# Patient Record
Sex: FEMALE | Race: WHITE | NOT HISPANIC OR LATINO | URBAN - METROPOLITAN AREA
[De-identification: names, ages, dates, MRNs, and addresses within clinical notes are randomized per-mention and may not be internally consistent; named-entity substitution may affect disease eponyms.]

---

## 2017-08-23 ENCOUNTER — OFFICE (OUTPATIENT)
Dept: URBAN - METROPOLITAN AREA CLINIC 11 | Facility: CLINIC | Age: 37
End: 2017-08-23

## 2017-08-23 VITALS
DIASTOLIC BLOOD PRESSURE: 72 MMHG | SYSTOLIC BLOOD PRESSURE: 118 MMHG | WEIGHT: 221 LBS | HEIGHT: 65 IN | HEART RATE: 79 BPM

## 2017-08-23 DIAGNOSIS — R10.9 UNSPECIFIED ABDOMINAL PAIN: ICD-10-CM

## 2017-08-23 DIAGNOSIS — E66.9 OBESITY, UNSPECIFIED: ICD-10-CM

## 2017-08-23 DIAGNOSIS — K58.9 IRRITABLE BOWEL SYNDROME WITHOUT DIARRHEA: ICD-10-CM

## 2017-08-23 LAB
CORTISOL LEVEL: 6.3 UG/DL
FREE T4: 1.06 NG/DL (ref 0.8–1.8)
THYROID STIMULATING HORMONE: TSH: 1.59 MIU/L (ref 0.3–4.2)

## 2017-08-23 PROCEDURE — 99243 OFF/OP CNSLTJ NEW/EST LOW 30: CPT | Performed by: INTERNAL MEDICINE

## 2017-08-23 RX ORDER — DICYCLOMINE HYDROCHLORIDE 10 MG/1
40 CAPSULE ORAL
Qty: 360 | Refills: 3 | Status: COMPLETED
Start: 2017-08-23 | End: 2020-09-18

## 2020-09-29 ENCOUNTER — OFFICE (OUTPATIENT)
Dept: URBAN - METROPOLITAN AREA CLINIC 19 | Facility: CLINIC | Age: 40
End: 2020-09-29

## 2020-09-29 VITALS
DIASTOLIC BLOOD PRESSURE: 83 MMHG | WEIGHT: 224 LBS | OXYGEN SATURATION: 96 % | SYSTOLIC BLOOD PRESSURE: 137 MMHG | HEART RATE: 86 BPM | HEIGHT: 64 IN

## 2020-09-29 DIAGNOSIS — K58.9 IRRITABLE BOWEL SYNDROME WITHOUT DIARRHEA: ICD-10-CM

## 2020-09-29 DIAGNOSIS — R14.0 ABDOMINAL DISTENSION (GASEOUS): ICD-10-CM

## 2020-09-29 DIAGNOSIS — R10.9 UNSPECIFIED ABDOMINAL PAIN: ICD-10-CM

## 2020-09-29 LAB
CBC, PLATELET, NO DIFFERENTIAL: HEMATOCRIT: 39.6 % (ref 34–46.6)
CBC, PLATELET, NO DIFFERENTIAL: HEMOGLOBIN: 12.3 G/DL (ref 11.1–15.9)
CBC, PLATELET, NO DIFFERENTIAL: MCH: 26.7 PG (ref 26.6–33)
CBC, PLATELET, NO DIFFERENTIAL: MCHC: 31.1 G/DL — LOW (ref 31.5–35.7)
CBC, PLATELET, NO DIFFERENTIAL: MCV: 86 FL (ref 79–97)
CBC, PLATELET, NO DIFFERENTIAL: PLATELETS: 404 X10E3/UL (ref 150–450)
CBC, PLATELET, NO DIFFERENTIAL: RBC: 4.61 X10E6/UL (ref 3.77–5.28)
CBC, PLATELET, NO DIFFERENTIAL: RDW: 13.1 % (ref 11.7–15.4)
CBC, PLATELET, NO DIFFERENTIAL: WBC: 7.3 X10E3/UL (ref 3.4–10.8)
CELIAC DISEASE HLA DQ ASSOC.: ADDITIONAL INFORMATION: (no result)
CELIAC DISEASE HLA DQ ASSOC.: COMMENT: (no result)
CELIAC DISEASE HLA DQ ASSOC.: DQ2 (DQA1 0501/0505,DQB1 02XX): POSITIVE
CELIAC DISEASE HLA DQ ASSOC.: DQ8 (DQA1 03XX, DQB1 0302): NEGATIVE
CELIAC DISEASE II: ENDOMYSIAL ANTIBODY IGA: NEGATIVE
CELIAC DISEASE II: IMMUNOGLOBULIN A, QN, SERUM: 275 MG/DL (ref 87–352)
CELIAC DISEASE II: T-TRANSGLUTAMINASE (TTG) IGA: <2 U/ML
CELIAC DISEASE II: T-TRANSGLUTAMINASE (TTG) IGG: <2 U/ML
COMP. METABOLIC PANEL (14): A/G RATIO: 1.6 (ref 1.2–2.2)
COMP. METABOLIC PANEL (14): ALBUMIN: 4.6 G/DL (ref 3.8–4.8)
COMP. METABOLIC PANEL (14): ALKALINE PHOSPHATASE: 77 IU/L (ref 39–117)
COMP. METABOLIC PANEL (14): ALT (SGPT): 20 IU/L (ref 0–32)
COMP. METABOLIC PANEL (14): AST (SGOT): 20 IU/L (ref 0–40)
COMP. METABOLIC PANEL (14): BILIRUBIN, TOTAL: <0.2 MG/DL
COMP. METABOLIC PANEL (14): BUN/CREATININE RATIO: 19 (ref 9–23)
COMP. METABOLIC PANEL (14): BUN: 14 MG/DL (ref 6–24)
COMP. METABOLIC PANEL (14): CALCIUM: 9.7 MG/DL (ref 8.7–10.2)
COMP. METABOLIC PANEL (14): CARBON DIOXIDE, TOTAL: 25 MMOL/L (ref 20–29)
COMP. METABOLIC PANEL (14): CHLORIDE: 101 MMOL/L (ref 96–106)
COMP. METABOLIC PANEL (14): CREATININE: 0.75 MG/DL (ref 0.57–1)
COMP. METABOLIC PANEL (14): EGFR IF AFRICN AM: 114 ML/MIN/1.73 (ref 59–?)
COMP. METABOLIC PANEL (14): EGFR IF NONAFRICN AM: 99 ML/MIN/1.73 (ref 59–?)
COMP. METABOLIC PANEL (14): GLOBULIN, TOTAL: 2.9 G/DL (ref 1.5–4.5)
COMP. METABOLIC PANEL (14): GLUCOSE: 96 MG/DL (ref 65–99)
COMP. METABOLIC PANEL (14): POTASSIUM: 4.1 MMOL/L (ref 3.5–5.2)
COMP. METABOLIC PANEL (14): PROTEIN, TOTAL: 7.5 G/DL (ref 6–8.5)
COMP. METABOLIC PANEL (14): SODIUM: 141 MMOL/L (ref 134–144)
THYROXINE (T4) FREE, DIRECT, S: T4,FREE(DIRECT): 0.99 NG/DL (ref 0.82–1.77)
TSH: 1.42 UIU/ML (ref 0.45–4.5)

## 2020-09-29 PROCEDURE — 99213 OFFICE O/P EST LOW 20 MIN: CPT | Performed by: INTERNAL MEDICINE

## 2020-09-29 RX ORDER — LACTOBACIL 2/BIFIDO 1/S.THERMO 450B CELL
PACKET (EA) ORAL
Qty: 60 | Refills: 3 | Status: COMPLETED
Start: 2020-09-29 | End: 2021-05-01

## 2021-01-25 VITALS — HEIGHT: 64 IN

## 2021-01-26 ENCOUNTER — OFFICE (OUTPATIENT)
Dept: URBAN - METROPOLITAN AREA TELEHEALTH 10 | Facility: TELEHEALTH | Age: 41
End: 2021-01-26

## 2021-01-26 DIAGNOSIS — R14.0 ABDOMINAL DISTENSION (GASEOUS): ICD-10-CM

## 2021-01-26 DIAGNOSIS — R10.9 UNSPECIFIED ABDOMINAL PAIN: ICD-10-CM

## 2021-01-26 DIAGNOSIS — K58.9 IRRITABLE BOWEL SYNDROME WITHOUT DIARRHEA: ICD-10-CM

## 2021-01-26 RX ORDER — PRUCALOPRIDE 2 MG/1
2 TABLET, FILM COATED ORAL
Qty: 30 | Refills: 5 | Status: COMPLETED
Start: 2020-10-30 | End: 2021-05-15

## 2021-01-26 NOTE — SERVICEHPINOTES
Ms. Lloyd is a 39yo F that presents for follow-up of her constipation and bloating.  Her visit today was a telemedicine visit as she requested this type of visit due to the current COVID-19 pandemic.  She was initially seen in August 2017 an outline of her history can be seen below.  She presented back to clinic in September 2020 to reestablish care regarding her abdominal pain and constipation. At that time she had recently been diagnosed with central sensitization syndrome manifested by fibromyalgia and chronic fatigue syndrome at [facility].  She had ongoing issues with constipation and bloating.  After that visit I checked some labs which were without any significant abnormalities and we also obtained a Sitz marker study which were distributed fairly evenly throughout the colon although there were not many in the rectum and most were in the right left colon area.  While on the Linzess she had some issues with cramping and some urgency.  We switched her to Motegrity.  She says that the Motegrity works best for her.  While it is not perfect and she does continue to use some MiraLax which she just on a day-to-day basis, but with this she says her bowel habits are much better overall.  She is happy with her current regimen.  Otherwise, she is doing very well.The patient was initially seen in clinic in August 2017 at that time she was referred for evaluation of abdominal pain in the setting of a known history of irritable bowel syndrome, migraines, and PCOS. 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 She said she had a colonoscopy during that time period as well and this was normal. When I saw her she had 1 month of symptoms which mostly consistent of right lower quadrant abdominal pain. Her pain has been worsening and she was actually seen in the emergency department in early August 2017 and there had a normal lab evaluation as well as a CT scan of the abdomen pelvis which was without significant abnormality. Based on her presentation it was felt she possibly had some recurrence of her irritable bowel syndrome and she was managed conservatively with Bentyl and was to follow up in clinic however did not follow back up.  She then presented back to the office in September 2020 with complaints of ongoing abdominal pain and constipation. At that time she also endorsed that she had recently been diagnosed with a neurologic condition as she developed symptoms of weakness and oropharyngeal dysphagia.  Apparently she was ultimately evaluated at [facility] and was diagnosed with "central sensitization syndrome manifested by fibromyalgia and chronic fatigue syndrome".

## 2021-07-28 ENCOUNTER — OFFICE (OUTPATIENT)
Dept: URBAN - METROPOLITAN AREA CLINIC 11 | Facility: CLINIC | Age: 41
End: 2021-07-28

## 2021-07-28 VITALS
WEIGHT: 224 LBS | DIASTOLIC BLOOD PRESSURE: 75 MMHG | OXYGEN SATURATION: 98 % | HEART RATE: 101 BPM | HEIGHT: 64 IN | SYSTOLIC BLOOD PRESSURE: 120 MMHG

## 2021-07-28 DIAGNOSIS — K64.9 UNSPECIFIED HEMORRHOIDS: ICD-10-CM

## 2021-07-28 DIAGNOSIS — R14.0 ABDOMINAL DISTENSION (GASEOUS): ICD-10-CM

## 2021-07-28 DIAGNOSIS — K58.9 IRRITABLE BOWEL SYNDROME WITHOUT DIARRHEA: ICD-10-CM

## 2021-07-28 PROCEDURE — 99213 OFFICE O/P EST LOW 20 MIN: CPT | Performed by: INTERNAL MEDICINE

## 2021-07-28 RX ORDER — HYDROCORTISONE 25 MG/G
CREAM TOPICAL
Qty: 30 | Refills: 3 | Status: ACTIVE
Start: 2021-07-28